# Patient Record
Sex: FEMALE | Race: WHITE | URBAN - METROPOLITAN AREA
[De-identification: names, ages, dates, MRNs, and addresses within clinical notes are randomized per-mention and may not be internally consistent; named-entity substitution may affect disease eponyms.]

---

## 2017-10-26 ENCOUNTER — ANESTHESIA - HEALTHEAST (OUTPATIENT)
Dept: SURGERY | Facility: HOSPITAL | Age: 57
End: 2017-10-26

## 2017-10-27 ENCOUNTER — SURGERY - HEALTHEAST (OUTPATIENT)
Dept: SURGERY | Facility: HOSPITAL | Age: 57
End: 2017-10-27

## 2020-01-29 ENCOUNTER — RECORDS - HEALTHEAST (OUTPATIENT)
Dept: ADMINISTRATIVE | Facility: OTHER | Age: 60
End: 2020-01-29

## 2021-05-31 VITALS — HEIGHT: 55 IN | BODY MASS INDEX: 30.78 KG/M2 | WEIGHT: 133 LBS

## 2021-06-13 NOTE — ANESTHESIA POSTPROCEDURE EVALUATION
Patient: Nelly Boles  DENTAL EXAMINATION, X-RAYS OF TEETH, DENTAL PROPHYLAXIS AND DENTAL RESTORATIONS  Anesthesia type: general    Patient location: PACU  Last vitals:   Vitals:    10/27/17 1515   BP: 107/61   Pulse: 65   Resp: 16   Temp:    SpO2: 92%     Post vital signs: stable  Level of consciousness: awake and responds to simple questions  Post-anesthesia pain: pain controlled  Post-anesthesia nausea and vomiting: no  Pulmonary: unassisted, return to baseline  Cardiovascular: stable and blood pressure at baseline  Hydration: adequate  Anesthetic events: no    QCDR Measures:  ASA# 11 - Sharon-op Cardiac Arrest: ASA11B - Patient did NOT experience unanticipated cardiac arrest  ASA# 12 - Sharon-op Mortality Rate: ASA12B - Patient did NOT die  ASA# 13 - PACU Re-Intubation Rate: ASA13B - Patient did NOT require a new airway mgmt  ASA# 10 - Composite Anes Safety: ASA10A - No serious adverse event    Additional Notes:

## 2021-06-13 NOTE — ANESTHESIA PREPROCEDURE EVALUATION
Anesthesia Evaluation      Patient summary reviewed   History of anesthetic complications (Hx slow emergence.)     Airway   Mallampati: III  Neck ROM: limited   Pulmonary - negative ROS and normal exam                          Cardiovascular   (+) valvular problems/murmurs (Hx cardiac murmur), , hypercholesterolemia,     ECG reviewed     PE comment: Bradycardia,     Neuro/Psych    (+) anxiety/panic attacks, chronic pain (Chronic pain left ankle)  (-) no seizures, no CVA    Comments: Down's Syndrome  Intellectual disabilities    Hearing loss    Endo/Other    (+) hypothyroidism,   (-) no diabetes     GI/Hepatic/Renal    (-) GERD, impaired hepatic function, renal disease     Other findings: Atopic dermatitis  TMJ Syndrome    K 4.1, Glu 96, Cr 1.04      Dental    (+) poor dentition                       Anesthesia Plan  Planned anesthetic: general endotracheal  Glidescope intubation  Decadron 10 mg IV  Pre-op PO Versed 20 mg  ASA 3   Induction: intravenous   Anesthetic plan and risks discussed with: patient and legal guardian  Anesthesia plan special considerations: video-assisted, antiemetics,   Post-op plan: routine recovery

## 2021-06-13 NOTE — ANESTHESIA CARE TRANSFER NOTE
Last vitals:   Vitals:    10/27/17 1412   BP: 120/78   Pulse: 81   Resp: 12   Temp: 36.1  C (97  F)   SpO2: 100%     Patient's level of consciousness is drowsy  Spontaneous respirations: yes  Maintains airway independently: yes  Dentition unchanged: yes  Oropharynx: oropharynx clear of all foreign objects    QCDR Measures:  ASA# 20 - Surgical Safety Checklist: WHO surgical safety checklist completed prior to induction  PQRS# 430 - Adult PONV Prevention: 4558F - Pt received => 2 anti-emetic agents (different classes) preop & intraop  ASA# 8 - Peds PONV Prevention: NA - Not pediatric patient, not GA or 2 or more risk factors NOT present  PQRS# 424 - Sharon-op Temp Management: 4559F - At least one body temp DOCUMENTED => 35.5C or 95.9F within required timeframe  PQRS# 426 - PACU Transfer Protocol: - Transfer of care checklist used  ASA# 14 - Acute Post-op Pain: ASA14B - Patient did NOT experience pain >= 7 out of 10